# Patient Record
Sex: MALE | Race: BLACK OR AFRICAN AMERICAN
[De-identification: names, ages, dates, MRNs, and addresses within clinical notes are randomized per-mention and may not be internally consistent; named-entity substitution may affect disease eponyms.]

---

## 2018-03-29 ENCOUNTER — HOSPITAL ENCOUNTER (OUTPATIENT)
Dept: HOSPITAL 92 - BICRAD | Age: 43
Discharge: HOME | End: 2018-03-29
Attending: NEUROLOGICAL SURGERY
Payer: COMMERCIAL

## 2018-03-29 DIAGNOSIS — M43.22: ICD-10-CM

## 2018-03-29 DIAGNOSIS — M50.30: ICD-10-CM

## 2018-03-29 DIAGNOSIS — M54.12: Primary | ICD-10-CM

## 2018-03-29 PROCEDURE — 72050 X-RAY EXAM NECK SPINE 4/5VWS: CPT

## 2018-04-13 ENCOUNTER — HOSPITAL ENCOUNTER (OUTPATIENT)
Dept: HOSPITAL 92 - LABBT | Age: 43
Discharge: HOME | End: 2018-04-13
Attending: NEUROLOGICAL SURGERY
Payer: COMMERCIAL

## 2018-04-13 DIAGNOSIS — Z01.812: Primary | ICD-10-CM

## 2018-04-13 DIAGNOSIS — M48.02: ICD-10-CM

## 2018-04-13 DIAGNOSIS — M54.12: ICD-10-CM

## 2018-04-13 LAB
APTT PPP: 25.7 SEC (ref 22.9–36.1)
HGB BLD-MCNC: 15.6 G/DL (ref 14–18)
INR PPP: 1
MCH RBC QN AUTO: 29.7 PG (ref 27–31)
MCV RBC AUTO: 88.3 FL (ref 80–94)
PLATELET # BLD AUTO: 278 THOU/UL (ref 130–400)
PROTHROMBIN TIME: 13.5 SEC (ref 12–14.7)
RBC # BLD AUTO: 5.24 MILL/UL (ref 4.7–6.1)
WBC # BLD AUTO: 9.4 THOU/UL (ref 4.8–10.8)

## 2018-04-13 PROCEDURE — 85610 PROTHROMBIN TIME: CPT

## 2018-04-13 PROCEDURE — 85730 THROMBOPLASTIN TIME PARTIAL: CPT

## 2018-04-13 PROCEDURE — 85027 COMPLETE CBC AUTOMATED: CPT

## 2018-04-17 ENCOUNTER — HOSPITAL ENCOUNTER (OUTPATIENT)
Dept: HOSPITAL 92 - SDC | Age: 43
Discharge: HOME | End: 2018-04-17
Attending: NEUROLOGICAL SURGERY
Payer: COMMERCIAL

## 2018-04-17 VITALS — BODY MASS INDEX: 21.9 KG/M2

## 2018-04-17 DIAGNOSIS — M50.123: Primary | ICD-10-CM

## 2018-04-17 DIAGNOSIS — M48.02: ICD-10-CM

## 2018-04-17 PROCEDURE — 0RG20A0 FUSION OF 2 OR MORE CERVICAL VERTEBRAL JOINTS WITH INTERBODY FUSION DEVICE, ANTERIOR APPROACH, ANTERIOR COLUMN, OPEN APPROACH: ICD-10-PCS | Performed by: NEUROLOGICAL SURGERY

## 2018-04-17 PROCEDURE — 96374 THER/PROPH/DIAG INJ IV PUSH: CPT

## 2018-04-17 PROCEDURE — A4216 STERILE WATER/SALINE, 10 ML: HCPCS

## 2018-04-17 PROCEDURE — 76001: CPT

## 2018-04-17 PROCEDURE — C1776 JOINT DEVICE (IMPLANTABLE): HCPCS

## 2018-04-17 PROCEDURE — C1713 ANCHOR/SCREW BN/BN,TIS/BN: HCPCS

## 2018-04-17 NOTE — OP
DATE OF PROCEDURE:  04/17/2018

 

SURGEON:  L. Gerard III Toussaint, M.D.

 

ASSISTANT:  Franky Alicia PA-C

 

PREOPERATIVE INDICATION:  Prevent neurological deterioration.

 

PREOPERATIVE DIAGNOSES:  Cervical intervertebral disk disease with instability at C4-C5 and stenosis,
 with radiculopathy and stenosis at C6-C7.

 

POSTOPERATIVE DIAGNOSES:  Cervical intervertebral disk disease with instability at C4-C5 and stenosis
, with radiculopathy and stenosis at C6-C7.

 

OPERATIVE PROCEDURE:  Anterior cervical diskectomy, intervertebral arthrodesis, placement of interver
tebral biomechanical device, local morselized autograft, morselized allograft, and anterior cervical 
plating at C4-C5 and at C6-C7, and operating microscope.

 

PREOPERATIVE MEDICATIONS:  Ancef 2 grams IV.

 

DRAIN NUMBER:  Zero.

 

DRAIN TYPE:  None.

 

OPERATIVE DICTATION:  The patient was brought to the operating room.  General endotracheal anesthesia
 was induced.  The patient's head was carefully positioned on a gel-filled donut shaped head rest and
 a lateral fluoro radiograph was used to plan our incision.  The right side of the neck was sterilely
 prepped and draped.  We opened with a 10 blade knife and controlled bleeding with bipolar cautery.  
We dissected sharply to the platysma and cut this muscle in line with our incision.  We continued our
 dissection medial to the sternocleidomastoid and lateral to the trachea and esophagus.  We arrived a
t the prevertebral space and placed a marker at C6-C7.  We took a lateral fluoro radiograph to confir
m the level upon which we were operating.  We then elevated the longus colli muscles off the anterior
 surface of C4 and C5 as well as C6 and C7.  We placed self-retaining retractors under the longus col
li muscles at C4-C5 and placed a distraction pin at C4 and C5.  We distracted across the intervening 
interspace.  We incised the interspace with a 15 blade knife and removed disk contents using curettes
 and rongeurs.  As we approached the posterior longitudinal ligament, we brought the operative micros
cope into the field.

 

Under microscopic magnification using microsurgical techniques, we removed the remainder of the inter
vertebral disk.  We accessed the ventral epidural space with a micro curette.  We removed posterior o
steophytes and the posterior longitudinal ligament across the entire interspace at C4-C5, decompressi
ng both the dura and the spinal canal, and both nerve roots in their foramina.  We then turned our at
tention to arthrodesis.

 

We used angled curettes to prepare the endplates for grafting and then used a bone rasp to measure th
e height of the interspace.  A 6 mm PEEK graft was brought into the field.  Osteophytes removed durin
g our decompression were carefully morselized on the back table.  This morselized bone (cleaned of al
l soft tissue attachments) was added to demineralized bone matrix as our fusion substrate and the sub
strate was placed in the intervertebral graft.  The graft was advanced into the interspace to the khalida
ropriate depth under radiographic guidance.  We removed distraction pins from C4 and C5 and moved the
m to C6 and C7 as well as the lateral retractor.  We distracted across this interspace, incised it an
d removed disk contents.  We continued removing disk until we approached the posterior longitudinal l
igament.  Posterior osteophytes as well as the ligament were removed across the entire interspace at 
C6-C7 decompressing the spinal canal, the dura and the nerve roots in their foramina.  We prepared th
e endplates for grafting and measured the height of the interspace to 6 mm.  Another 6 mm PEEK graft 
was brought into the field.  It was loaded with demineralized bone matrix and morselized autograft an
d advanced into the interspace under radiographic guidance.  Distraction pins were removed from C6 an
d C7.  Two separate 12 mm anterior cervical plates were brought into the field.  We affixed the plate
 at C4-C5 and at C6-C7 using 14 mm screws.  Fixed angle screws were used at C5 and C7 and variable an
gle screws at C4 and C6.  AP and lateral fluoro radiographs confirmed adequate position of our instru
mentation.  We irrigated copiously, bacitracin irrigation.  We closed the wound in anatomic layers an
d we applied a sterile dressing.  This was a clean case and no contamination.

## 2018-06-04 ENCOUNTER — HOSPITAL ENCOUNTER (OUTPATIENT)
Dept: HOSPITAL 92 - TBSIIMAG | Age: 43
Discharge: HOME | End: 2018-06-04
Attending: NEUROLOGICAL SURGERY
Payer: COMMERCIAL

## 2018-06-04 DIAGNOSIS — Z98.890: ICD-10-CM

## 2018-06-04 DIAGNOSIS — M54.2: Primary | ICD-10-CM

## 2018-06-04 PROCEDURE — 72040 X-RAY EXAM NECK SPINE 2-3 VW: CPT

## 2018-06-04 NOTE — RAD
CERVICAL SPINE SERIES 3 VIEWS:

 

HISTORY: 

Followup with neck surgery.

 

FINDINGS: 

The patient has undergone an anterior cervical fusion with placement of plate and screws at C4-5 and 
C6-7.  Markers of implants are within the confines of the disk level.  There is what are probably con
genital changes across the C5-6 level.

 

IMPRESSION: 

Postoperative changes of the spine.

 

POS: DONALD